# Patient Record
(demographics unavailable — no encounter records)

---

## 2020-01-01 NOTE — DSES
DATE OF BIRTH AND DATE OF ADMISSION:  2020

DATE OF DISCHARGE:   2020

 

DIAGNOSES:

1.  Early term female .

2.  Infant of diabetic mother.

3.  Transient hypoglycemia.

 

PROCEDURE DURING HOSPITALIZATION:

1.  Bili check.

2.  Hearing screen.

 

HISTORY:  This child is an early term female  who was delivered at 37 and

2/7 weeks gestational age by induced vaginal delivery at [facility]

on the morning of 2020.  Mother is 33 years old,  5, now para 4.

Her blood type is O negative.  Her group B strep screen was negative.  Her

hepatitis B surface antigen, RPR and HIV status were all negative.  Pregnancy was

complicated by polyhydramnios and gestational diabetes.  The child was given

Apgar scores of 8 at one minute and 9 at five minutes.  Birth weight 2890 grams,

which is 6 pounds and 6 ounces, length 20 inches, head circumference 12-1/2

inches.   physical examination was normal.  The child was given her

initial hepatitis B vaccination on her day of delivery.  The child had an initial

blood sugar of 37.  She was given a feeding, and her subsequent blood sugars have

been stable, greater than 40.  Mother's blood type is O negative.  The baby's

blood type is B positive.  Both the direct and indirect Jorge Luis test were

negative.  The child passed a hearing screen.  Parents requested that the child

be discharged on the afternoon of 2020.  The child was doing well, and

there was no contraindication to early discharge.  Her weight on the day of

discharge is 2766 grams, which is 6 pounds 2 ounces.  On the day of discharge,

the child was active and responsive.  She had good color and perfusion in room

air.  Her breath sounds were clear with good aeration.  Her heart was regular

with no murmur and her abdomen was soft and nondistended.  She had a bili check

of 5.4.  She was breast-feeding well.  She passed a hearing screen.  The child's

followup care is going to be at [facility].  She is scheduled to be

seen at the office on 2020 for a followup checkup.  I gave parents a

summary of the child's hospital course to take with them to the office for her

office records.

## 2020-01-01 NOTE — NBADM
Termo Admission Note


Date of Admission


Mar 11, 2020 at 08:27





History


This is a baby girl born at 37.2 weeks of gestational age via spontaneous 

vaginal delivery to a 33-year-old  (G)5 para (P)4-0-1-4 mother who is 

blood type O-, hepatitis B negative, rapid plasma reagin (RPR) nonreactive, HIV 

negative, group B Streptococcus negative. Baby cried at birth. Apgar scores were

8 at one minute and 9 at five minutes. Baby was admitted to the Mother-Baby 

unit. Baby is breast-feeding however, mom did supplement 1 time with 10 mL of 

formula. Mom says she is having a little bit of difficulty with breast-feeding 

but is otherwise going well. Baby has voided and stooled.





Physical Examination


Physical Measurements


On admission, the baby's weight is 2890 grams, weight this morning was 2766 g 

which represents a 4.2% weight loss, length is 50.8 cm, and head circumference 

is 31.5 cm.


Vital Signs





Vital Signs








  Date Time  Temp Pulse Resp B/P (MAP) Pulse Ox O2 Delivery O2 Flow Rate FiO2


 


3/11/20 08:44 98.8 161 41 54/27 (36)  Room Air  








General:  Positive: Active; 


   Negative: Respiratory Distress, Dysmorphic Features


HEENT:  Positive: Normocephalic, Anterior [place] Open, Positive Red Reflexes

Ankit, Nares Patent, Ears Well Formed, Ears Well Set; 


   Negative: Cleft Lip, Cleft Palate


Heart:  Positive: S1,S2; 


   Negative: Murmur


Lungs:  Positive: Good Bilateral Air Entry; 


   Negative: Grunting and Retractions, Tachypnea


Abdomen:  Positive: Soft, 3 Vessel Cord, Bowel sounds Present; 


   Negative: Distended


Female Genitalia:  Positive: Normal Term Genitalia


Anus:  Positive: Patent


Extremities:  Positive: Full ROM Times 4, Femoral Pulses; 


   Negative: Hip Click


Skin:  Positive: Normal for Gestation, Normal Capillary Refill


Neurological:  POSITIVE: Good Tone, Positive Burak Reflex, Positive Suck Reflex, 

Positive Grasp Reflex





Asessment


Problems:  


(1) Liveborn infant





Plan


1. Admit to mother-baby unit.


2. Routine  care.


3. Mother updated on condition and plan for the baby.











EVANGELISTA CROWDER DO               Mar 12, 2020 10:19